# Patient Record
Sex: FEMALE | Race: WHITE | NOT HISPANIC OR LATINO | URBAN - NONMETROPOLITAN AREA
[De-identification: names, ages, dates, MRNs, and addresses within clinical notes are randomized per-mention and may not be internally consistent; named-entity substitution may affect disease eponyms.]

---

## 2017-12-18 ENCOUNTER — IMPORTED ENCOUNTER (OUTPATIENT)
Dept: URBAN - NONMETROPOLITAN AREA CLINIC 1 | Facility: CLINIC | Age: 27
End: 2017-12-18

## 2017-12-18 PROBLEM — H52.13: Noted: 2017-12-18

## 2017-12-18 PROBLEM — H52.223: Noted: 2017-12-18

## 2017-12-18 PROCEDURE — 92015 DETERMINE REFRACTIVE STATE: CPT

## 2017-12-18 PROCEDURE — 92014 COMPRE OPH EXAM EST PT 1/>: CPT

## 2017-12-18 NOTE — PATIENT DISCUSSION
Myopia-Discussed diagnosis with patient. -Explained that people who are myopic are at a higher risk for developing RD/RT and reviewed associated S&S.-Pt to contact our office if symptoms develop. Astigmatism-Discussed diagnosis with patient. Updated spec Rx given.

## 2021-02-08 ENCOUNTER — IMPORTED ENCOUNTER (OUTPATIENT)
Dept: URBAN - NONMETROPOLITAN AREA CLINIC 1 | Facility: CLINIC | Age: 31
End: 2021-02-08

## 2021-02-08 PROCEDURE — 92015 DETERMINE REFRACTIVE STATE: CPT

## 2021-02-08 PROCEDURE — 92004 COMPRE OPH EXAM NEW PT 1/>: CPT

## 2021-02-08 PROCEDURE — 92310 CONTACT LENS FITTING OU: CPT

## 2021-02-08 NOTE — PATIENT DISCUSSION
Compound Myopic Astigmatism OU-  discussed findings w/patinet-  new spectacle/CL Rx issued-  continue to monitor yearly or prn; 's Notes: MR 2/8/2021DFE defer

## 2022-02-12 ASSESSMENT — VISUAL ACUITY
OS_SC: 20/20-
OS_CC: J1
OD_CC: J1
OD_SC: 20/20-

## 2022-02-12 ASSESSMENT — TONOMETRY
OS_IOP_MMHG: 14
OD_IOP_MMHG: 14

## 2022-04-09 ASSESSMENT — VISUAL ACUITY
OD_SC: 20/20
OS_SC: 20/20
OU_CC: 20/20

## 2022-04-09 ASSESSMENT — TONOMETRY
OD_IOP_MMHG: 16
OS_IOP_MMHG: 16

## 2024-05-06 ENCOUNTER — COMPREHENSIVE EXAM (OUTPATIENT)
Dept: URBAN - NONMETROPOLITAN AREA CLINIC 4 | Facility: CLINIC | Age: 34
End: 2024-05-06

## 2024-05-06 DIAGNOSIS — H52.223: ICD-10-CM

## 2024-05-06 DIAGNOSIS — H52.13: ICD-10-CM

## 2024-05-06 PROCEDURE — 92015 DETERMINE REFRACTIVE STATE: CPT

## 2024-05-06 PROCEDURE — 92014 COMPRE OPH EXAM EST PT 1/>: CPT

## 2024-05-06 ASSESSMENT — TONOMETRY
OD_IOP_MMHG: 16
OS_IOP_MMHG: 15

## 2024-05-06 ASSESSMENT — VISUAL ACUITY
OS_CC: 20/20
OD_CC: 20/20